# Patient Record
Sex: MALE | Race: OTHER | NOT HISPANIC OR LATINO
[De-identification: names, ages, dates, MRNs, and addresses within clinical notes are randomized per-mention and may not be internally consistent; named-entity substitution may affect disease eponyms.]

---

## 2018-05-25 PROBLEM — Z00.00 ENCOUNTER FOR PREVENTIVE HEALTH EXAMINATION: Status: ACTIVE | Noted: 2018-05-25

## 2018-06-22 ENCOUNTER — APPOINTMENT (OUTPATIENT)
Dept: NEUROLOGY | Facility: CLINIC | Age: 68
End: 2018-06-22
Payer: MEDICARE

## 2018-06-22 VITALS
BODY MASS INDEX: 22.76 KG/M2 | DIASTOLIC BLOOD PRESSURE: 82 MMHG | WEIGHT: 145 LBS | HEIGHT: 67 IN | SYSTOLIC BLOOD PRESSURE: 114 MMHG

## 2018-06-22 DIAGNOSIS — G20 PARKINSON'S DISEASE: ICD-10-CM

## 2018-06-22 PROCEDURE — 99215 OFFICE O/P EST HI 40 MIN: CPT

## 2018-06-22 RX ORDER — GABAPENTIN 300 MG
300 TABLET ORAL DAILY
Refills: 0 | Status: ACTIVE | COMMUNITY

## 2018-11-02 ENCOUNTER — APPOINTMENT (OUTPATIENT)
Dept: NEUROLOGY | Facility: CLINIC | Age: 68
End: 2018-11-02
Payer: MEDICARE

## 2018-11-02 VITALS
WEIGHT: 148 LBS | HEIGHT: 67 IN | SYSTOLIC BLOOD PRESSURE: 119 MMHG | BODY MASS INDEX: 23.23 KG/M2 | DIASTOLIC BLOOD PRESSURE: 67 MMHG | HEART RATE: 94 BPM

## 2018-11-02 VITALS — SYSTOLIC BLOOD PRESSURE: 116 MMHG | DIASTOLIC BLOOD PRESSURE: 62 MMHG | HEART RATE: 92 BPM

## 2018-11-02 PROCEDURE — 99215 OFFICE O/P EST HI 40 MIN: CPT

## 2018-11-06 ENCOUNTER — OTHER (OUTPATIENT)
Age: 68
End: 2018-11-06

## 2018-11-10 ENCOUNTER — APPOINTMENT (OUTPATIENT)
Dept: MRI IMAGING | Facility: CLINIC | Age: 68
End: 2018-11-10
Payer: COMMERCIAL

## 2018-11-10 ENCOUNTER — OUTPATIENT (OUTPATIENT)
Dept: OUTPATIENT SERVICES | Facility: HOSPITAL | Age: 68
LOS: 1 days | End: 2018-11-10
Payer: COMMERCIAL

## 2018-11-10 DIAGNOSIS — G20 PARKINSON'S DISEASE: ICD-10-CM

## 2018-11-10 PROCEDURE — 72148 MRI LUMBAR SPINE W/O DYE: CPT

## 2018-11-10 PROCEDURE — 72148 MRI LUMBAR SPINE W/O DYE: CPT | Mod: 26

## 2019-02-06 ENCOUNTER — APPOINTMENT (OUTPATIENT)
Dept: PULMONOLOGY | Facility: CLINIC | Age: 69
End: 2019-02-06
Payer: MEDICARE

## 2019-02-06 VITALS
SYSTOLIC BLOOD PRESSURE: 137 MMHG | BODY MASS INDEX: 24.01 KG/M2 | HEART RATE: 84 BPM | OXYGEN SATURATION: 100 % | DIASTOLIC BLOOD PRESSURE: 85 MMHG | WEIGHT: 153 LBS | HEIGHT: 67 IN

## 2019-02-06 DIAGNOSIS — F41.9 ANXIETY DISORDER, UNSPECIFIED: ICD-10-CM

## 2019-02-06 DIAGNOSIS — G47.00 INSOMNIA, UNSPECIFIED: ICD-10-CM

## 2019-02-06 PROCEDURE — 99204 OFFICE O/P NEW MOD 45 MIN: CPT

## 2019-02-06 RX ORDER — CITALOPRAM HYDROBROMIDE 10 MG/1
10 TABLET, FILM COATED ORAL DAILY
Refills: 0 | Status: DISCONTINUED | COMMUNITY
End: 2019-02-06

## 2019-02-06 RX ORDER — CLONAZEPAM 1 MG/1
1 TABLET ORAL
Qty: 30 | Refills: 0 | Status: DISCONTINUED | COMMUNITY
Start: 2018-01-10 | End: 2019-02-06

## 2019-02-06 NOTE — DISCUSSION/SUMMARY
[FreeTextEntry1] : The patient was advised to take Xanax at bedtime along with Seroquel. He has been taking his medications at anytime of the day.\par He was advised to be active during the day. He was advised to walk in his house and he is afraid to walk outside the house.\par Details of sleep hygiene measures were discussed with the patient and his sister

## 2019-02-06 NOTE — PHYSICAL EXAM
[General Appearance - Well Developed] : well developed [Normal Appearance] : normal appearance [Well Groomed] : well groomed [General Appearance - Well Nourished] : well nourished [No Deformities] : no deformities [General Appearance - In No Acute Distress] : no acute distress [Normal Conjunctiva] : the conjunctiva exhibited no abnormalities [Eyelids - No Xanthelasma] : the eyelids demonstrated no xanthelasmas [Normal Oropharynx] : normal oropharynx [Neck Appearance] : the appearance of the neck was normal [Neck Cervical Mass (___cm)] : no neck mass was observed [Jugular Venous Distention Increased] : there was no jugular-venous distention [Thyroid Diffuse Enlargement] : the thyroid was not enlarged [Thyroid Nodule] : there were no palpable thyroid nodules [Heart Rate And Rhythm] : heart rate and rhythm were normal [Heart Sounds] : normal S1 and S2 [Murmurs] : no murmurs present [Respiration, Rhythm And Depth] : normal respiratory rhythm and effort [Exaggerated Use Of Accessory Muscles For Inspiration] : no accessory muscle use [Auscultation Breath Sounds / Voice Sounds] : lungs were clear to auscultation bilaterally [Abdomen Soft] : soft [Abdomen Tenderness] : non-tender [Abdomen Mass (___ Cm)] : no abdominal mass palpated [Abnormal Walk] : normal gait [Gait - Sufficient For Exercise Testing] : the gait was sufficient for exercise testing [Nail Clubbing] : no clubbing of the fingernails [Cyanosis, Localized] : no localized cyanosis [Petechial Hemorrhages (___cm)] : no petechial hemorrhages [Skin Color & Pigmentation] : normal skin color and pigmentation [Skin Turgor] : normal skin turgor [] : no rash [Deep Tendon Reflexes (DTR)] : deep tendon reflexes were 2+ and symmetric [Sensation] : the sensory exam was normal to light touch and pinprick [No Focal Deficits] : no focal deficits [Oriented To Time, Place, And Person] : oriented to person, place, and time [Impaired Insight] : insight and judgment were intact [Affect] : the affect was normal [FreeTextEntry1] : Extremely anxious

## 2019-02-06 NOTE — HISTORY OF PRESENT ILLNESS
[FreeTextEntry1] : Patient with h/o insomnia for more than 4 years. The patient has been tried on Clonazepam, celexa, ambien.  He states that he cannot sleep at all at night.\par He takes Xanax at 7 PM and takes naps on off .\par The patient has a diagnosis of parkinsonism. The sister who has accompanied him today states that he does not watch television nor does he read anything. All the time he is concerned that he is not sleeping.\par I reviewed the several medications he has been on. The patient states that none of which helped him sleep. He is overly concerned about not sleeping. He states that last night he did not sleep even couple of minutes. The sister states that the patient's wife and son state that the patient sleeps a lot at home.\par The patient does not go to the house. He does not have any interests.\par He is followed by psychiatrist and a neurologist at [facility].\par \par \par

## 2019-02-06 NOTE — REVIEW OF SYSTEMS
[Fatigue] : fatigue [Memory Loss] : ~T memory lapses or loss [Confusion] : confusion [Depression] : depression [Anxiety] : anxiety [Negative] : Psychiatric [de-identified] : Stiffness

## 2019-02-15 ENCOUNTER — APPOINTMENT (OUTPATIENT)
Dept: NEUROLOGY | Facility: CLINIC | Age: 69
End: 2019-02-15

## 2019-03-29 ENCOUNTER — APPOINTMENT (OUTPATIENT)
Dept: NEUROLOGY | Facility: CLINIC | Age: 69
End: 2019-03-29

## 2019-05-30 ENCOUNTER — APPOINTMENT (OUTPATIENT)
Dept: NEUROLOGY | Facility: CLINIC | Age: 69
End: 2019-05-30
Payer: MEDICARE

## 2019-05-30 ENCOUNTER — APPOINTMENT (OUTPATIENT)
Dept: NEUROLOGY | Facility: CLINIC | Age: 69
End: 2019-05-30

## 2019-05-30 VITALS
BODY MASS INDEX: 23.54 KG/M2 | HEART RATE: 88 BPM | SYSTOLIC BLOOD PRESSURE: 137 MMHG | WEIGHT: 150 LBS | HEIGHT: 67 IN | DIASTOLIC BLOOD PRESSURE: 90 MMHG

## 2019-05-30 VITALS — SYSTOLIC BLOOD PRESSURE: 131 MMHG | HEART RATE: 90 BPM | DIASTOLIC BLOOD PRESSURE: 84 MMHG

## 2019-05-30 DIAGNOSIS — N40.0 BENIGN PROSTATIC HYPERPLASIA WITHOUT LOWER URINARY TRACT SYMPMS: ICD-10-CM

## 2019-05-30 PROCEDURE — 99215 OFFICE O/P EST HI 40 MIN: CPT

## 2019-05-30 RX ORDER — RASAGILINE 1 MG/1
1 TABLET ORAL
Qty: 30 | Refills: 11 | Status: ACTIVE | COMMUNITY
Start: 2017-12-19 | End: 1900-01-01

## 2019-05-30 RX ORDER — CARBIDOPA AND LEVODOPA 25; 100 MG/1; MG/1
25-100 TABLET ORAL
Qty: 135 | Refills: 11 | Status: ACTIVE | COMMUNITY
Start: 2017-07-05 | End: 1900-01-01

## 2019-05-31 NOTE — HISTORY OF PRESENT ILLNESS
[FreeTextEntry1] : 69 yo RH male patient with Parkinson's disease for over 12 years, complicated by motor fluctuations, dyskinesias, and depression.\par \par Pt went to the [facility] ER at yesterday due to tachycardia. He was discharged and reportedly told it was due to anxiety from his Parkinson's diagnosis.\par \par Pt states his functional status has declined since the last visit. He states his gait has worsened and he feels general heaviness. Pt states he experiences waking up feeling "frozen" 3-4 x a week. Pt states he feel imbalanced and is often very "wobbly". He states he experiences shuffling of gait especially when making turns. Pt denies any recent falls. Pt states his tremors have worsened both bilaterally in his hands (L>R) and bilaterally in his legs (L>R). He states his worst times are in the mornings and 3 pm are his worst times. He states he has increased dyskinesia especially of the L side. Pt states he is still able to ADLs however his manual dexterity has worsened so it takes him longer to perform ADLs such as dressing himself.\par \par Pt denies dizziness, lightheadedness, and other near syncopal episodes related to [place]. Today BP is 137/90 sitting and 131/84 standing. \par \par Pt states he is not currently exercising. He is not currently in PT, OT, or speech therapy. He was in PT about a year ago. He spends most days at home.\par \par Pt states sleep is bad. Pt states he struggles with falling asleep and staying asleep. Pt denies nightmares and vivid dreams. Pt denies screaming, kicking, hitting acting out dreams or other related RBD movements. \par Pt states he experiences constipation daily.\par Pt states he experiences bladder retention and frequency during the day. He states he is in and out of the bathroom every 20 minutes during the day. He is currently on Tamsulosin HCL 0.4 mg. \par Pt states mood is anxious. He and his sister state that he is intermittently very anxious. He wife and children are planning to move to [place] and he doesn't want to go. He also has a history of depression.\par Pt states he has recently lost his appetite and weight.

## 2019-05-31 NOTE — ASSESSMENT
[FreeTextEntry1] : \par Patient is a 69 yo man with moderately advanced PD with onset in 2006.\par he has significant motor complications, with wearing-off and dyskinesias.\par Over the last few weeks he has been worse,with worse balance and overall worse function, which has led him to be more sedentary, with added deconditioning. He also has residual back pain and sciatica.\par \par Will add 1/2 Sinemet at lunch time, as his worst time is at 3 PM (Current dosing of  1 tab 4 times per day).\par Otherwise Continue current medication regimen.\par \par Encouraged to increase exercise and physical activity and maintain an active social and intellectual life.\par He is in need of intense physical therapy and rehabilitation.\par \par More than 50% of the visit were dedicated to review of treatment, therapeutic plan, and prognosis, and patient was counseled on coping and physical and emotional wellbeing.

## 2019-05-31 NOTE — ASSESSMENT
[FreeTextEntry1] : \par Patient is a 67 yo man with moderately advanced PD with onset in 2006.\par he has significant motor complications, with wearing-off and dyskinesias.\par Over the last few weeks he has been worse,with worse balance and overall worse function, which has led him to be more sedentary, with added deconditioning. He also has residual back pain and sciatica.\par \par Will add 1/2 Sinemet at lunch time, as his worst time is at 3 PM (Current dosing of  1 tab 4 times per day).\par Otherwise Continue current medication regimen.\par \par Encouraged to increase exercise and physical activity and maintain an active social and intellectual life.\par He is in need of intense physical therapy and rehabilitation.\par \par More than 50% of the visit were dedicated to review of treatment, therapeutic plan, and prognosis, and patient was counseled on coping and physical and emotional wellbeing.

## 2019-05-31 NOTE — HISTORY OF PRESENT ILLNESS
[FreeTextEntry1] : 67 yo RH male patient with Parkinson's disease for over 12 years, complicated by motor fluctuations, dyskinesias, and depression.\par \par Pt went to the [facility] ER at yesterday due to tachycardia. He was discharged and reportedly told it was due to anxiety from his Parkinson's diagnosis.\par \par Pt states his functional status has declined since the last visit. He states his gait has worsened and he feels general heaviness. Pt states he experiences waking up feeling "frozen" 3-4 x a week. Pt states he feel imbalanced and is often very "wobbly". He states he experiences shuffling of gait especially when making turns. Pt denies any recent falls. Pt states his tremors have worsened both bilaterally in his hands (L>R) and bilaterally in his legs (L>R). He states his worst times are in the mornings and 3 pm are his worst times. He states he has increased dyskinesia especially of the L side. Pt states he is still able to ADLs however his manual dexterity has worsened so it takes him longer to perform ADLs such as dressing himself.\par \par Pt denies dizziness, lightheadedness, and other near syncopal episodes related to [place]. Today BP is 137/90 sitting and 131/84 standing. \par \par Pt states he is not currently exercising. He is not currently in PT, OT, or speech therapy. He was in PT about a year ago. He spends most days at home.\par \par Pt states sleep is bad. Pt states he struggles with falling asleep and staying asleep. Pt denies nightmares and vivid dreams. Pt denies screaming, kicking, hitting acting out dreams or other related RBD movements. \par Pt states he experiences constipation daily.\par Pt states he experiences bladder retention and frequency during the day. He states he is in and out of the bathroom every 20 minutes during the day. He is currently on Tamsulosin HCL 0.4 mg. \par Pt states mood is anxious. He and his sister state that he is intermittently very anxious. He wife and children are planning to move to [place] and he doesn't want to go. He also has a history of depression.\par Pt states he has recently lost his appetite and weight.

## 2019-05-31 NOTE — PHYSICAL EXAM
[FreeTextEntry1] : Patient examined while "On".\par he has normal cognitive function, mild hypomimia with diminished blinking, mild reduction of voice volume.\par He has mild rigidity with some cogwheeling, present bilaterally, more evident on the right side.\par No tremor is present.\par There is mild loss of motor dexterity, with decrement at rapid sequential and rapid alternating movements, also slightly more evident on the right side.\par Foot tapping is slightly impaired, also with the right side more affected.\par Posture is mildly stooped, with some shortening of gait stride and mild asymmetry with right reduction.\par Postural reflexes are normal. He has mild generalized dyskinesias, especially on the right side.\par

## 2019-06-18 PROBLEM — N40.0 BPH (BENIGN PROSTATIC HYPERPLASIA): Status: ACTIVE | Noted: 2019-06-18

## 2019-06-18 RX ORDER — TAMSULOSIN HYDROCHLORIDE 0.4 MG/1
0.4 CAPSULE ORAL
Refills: 0 | Status: ACTIVE | COMMUNITY
Start: 2017-12-29

## 2019-06-18 RX ORDER — ALPRAZOLAM 1 MG/1
1 TABLET ORAL
Refills: 0 | Status: ACTIVE | COMMUNITY

## 2019-06-18 RX ORDER — QUETIAPINE FUMARATE 50 MG/1
50 TABLET ORAL
Refills: 0 | Status: ACTIVE | COMMUNITY
Start: 2017-12-27

## 2019-08-02 ENCOUNTER — APPOINTMENT (OUTPATIENT)
Dept: NEUROLOGY | Facility: CLINIC | Age: 69
End: 2019-08-02

## 2023-04-26 ENCOUNTER — NON-APPOINTMENT (OUTPATIENT)
Age: 73
End: 2023-04-26